# Patient Record
Sex: FEMALE | Race: OTHER | RURAL
[De-identification: names, ages, dates, MRNs, and addresses within clinical notes are randomized per-mention and may not be internally consistent; named-entity substitution may affect disease eponyms.]

---

## 2022-04-18 ENCOUNTER — CONSULTATION/EVALUATION (OUTPATIENT)
Dept: RURAL CLINIC 3 | Facility: CLINIC | Age: 82
End: 2022-04-18

## 2022-04-18 DIAGNOSIS — H25.13: ICD-10-CM

## 2022-04-18 PROCEDURE — 92004 COMPRE OPH EXAM NEW PT 1/>: CPT

## 2022-04-18 ASSESSMENT — VISUAL ACUITY
OD_BAT: 20/300
OS_CC: 20/60
OS_BAT: 20/400
OD_SC: 20/200
OD_PAM: 20/25+2
OS_PH: 20/50
OS_SC: 20/300
OS_AM: 20/60+1
OD_CC: 20/30-1

## 2022-04-18 ASSESSMENT — TONOMETRY
OD_IOP_MMHG: 11
OS_IOP_MMHG: 12

## 2022-04-18 NOTE — PATIENT DISCUSSION
(Surgical) Visually Significant (secondary to glare), discussed the risks, benefits, alternatives, and limitations of cataract surgery. The patient stated a full understanding and a desire to proceed with the procedure. The patient will need to return for pre-op appointment with cataract measurements and to have any additional questions answered, and start pre-operative eye drops as directed. Pt elects to proceed with OS first and then OD after with Stand/Trad OU & discussed need for bifocals S/p.

## 2022-05-18 ENCOUNTER — PRE-OP/H&P (OUTPATIENT)
Dept: RURAL CLINIC 3 | Facility: CLINIC | Age: 82
End: 2022-05-18

## 2022-05-18 VITALS
HEART RATE: 68 BPM | BODY MASS INDEX: 28.88 KG/M2 | WEIGHT: 163 LBS | HEIGHT: 63 IN | DIASTOLIC BLOOD PRESSURE: 74 MMHG | SYSTOLIC BLOOD PRESSURE: 122 MMHG

## 2022-05-18 DIAGNOSIS — Z01.818: ICD-10-CM

## 2022-05-18 PROCEDURE — 99499 UNLISTED E&M SERVICE: CPT

## 2022-05-18 ASSESSMENT — VISUAL ACUITY
OD_PAM: 20/25+2
OS_CC: 20/60
OS_BAT: 20/400
OD_BAT: 20/300
OD_SC: 20/200
OD_CC: 20/30-1
OS_SC: 20/300
OS_AM: 20/60+1
OS_PH: 20/50

## 2022-06-02 ENCOUNTER — POST OP/EVAL OF SECOND EYE (OUTPATIENT)
Dept: RURAL CLINIC 3 | Facility: CLINIC | Age: 82
End: 2022-06-02

## 2022-06-02 ENCOUNTER — SURGERY/PROCEDURE (OUTPATIENT)
Dept: RURAL CLINIC 3 | Facility: CLINIC | Age: 82
End: 2022-06-02

## 2022-06-02 VITALS
HEART RATE: 68 BPM | SYSTOLIC BLOOD PRESSURE: 124 MMHG | HEIGHT: 63 IN | DIASTOLIC BLOOD PRESSURE: 70 MMHG | WEIGHT: 163 LBS | BODY MASS INDEX: 28.88 KG/M2

## 2022-06-02 DIAGNOSIS — H25.12: ICD-10-CM

## 2022-06-02 PROCEDURE — 68841 INSJ RX ELUT IMPLT LAC CANAL: CPT

## 2022-06-02 PROCEDURE — 66984 XCAPSL CTRC RMVL W/O ECP: CPT

## 2022-06-02 PROCEDURE — 99024 POSTOP FOLLOW-UP VISIT: CPT

## 2022-06-02 ASSESSMENT — VISUAL ACUITY
OD_CC: 20/30-1
OD_BAT: 20/300
OD_SC: 20/200
OD_PAM: 20/25+2
OS_SC: 20/300

## 2022-06-02 ASSESSMENT — TONOMETRY
OD_IOP_MMHG: 11
OS_IOP_MMHG: 20

## 2022-06-02 NOTE — PATIENT DISCUSSION
Pt is doing well with lens in place & stable. Continue post op gtts & follow restrictions as instructed.

## 2022-06-07 ENCOUNTER — POST-OP (OUTPATIENT)
Dept: RURAL CLINIC 3 | Facility: CLINIC | Age: 82
End: 2022-06-07

## 2022-06-07 DIAGNOSIS — Z96.1: ICD-10-CM

## 2022-06-07 PROCEDURE — 99024 POSTOP FOLLOW-UP VISIT: CPT

## 2022-06-07 ASSESSMENT — VISUAL ACUITY
OD_CC: 20/40-2
OS_PH: 20/200
OD_PH: 20/30

## 2022-06-07 ASSESSMENT — TONOMETRY
OS_IOP_MMHG: 16
OD_IOP_MMHG: 16

## 2022-06-16 ENCOUNTER — SURGERY/PROCEDURE (OUTPATIENT)
Dept: RURAL CLINIC 4 | Facility: CLINIC | Age: 82
End: 2022-06-16

## 2022-06-16 ENCOUNTER — POST-OP (OUTPATIENT)
Dept: RURAL CLINIC 3 | Facility: CLINIC | Age: 82
End: 2022-06-16

## 2022-06-16 DIAGNOSIS — Z98.41: ICD-10-CM

## 2022-06-16 DIAGNOSIS — H25.11: ICD-10-CM

## 2022-06-16 DIAGNOSIS — Z98.42: ICD-10-CM

## 2022-06-16 PROCEDURE — 66984 XCAPSL CTRC RMVL W/O ECP: CPT

## 2022-06-16 PROCEDURE — 68841 INSJ RX ELUT IMPLT LAC CANAL: CPT

## 2022-06-16 PROCEDURE — 99024 POSTOP FOLLOW-UP VISIT: CPT

## 2022-06-16 ASSESSMENT — TONOMETRY
OS_IOP_MMHG: 14
OD_IOP_MMHG: 16

## 2022-06-16 ASSESSMENT — VISUAL ACUITY
OS_SC: 20/70
OD_SC: 20/300

## 2022-06-22 ENCOUNTER — POST-OP (OUTPATIENT)
Dept: RURAL CLINIC 3 | Facility: CLINIC | Age: 82
End: 2022-06-22

## 2022-06-22 DIAGNOSIS — Z98.41: ICD-10-CM

## 2022-06-22 DIAGNOSIS — Z98.42: ICD-10-CM

## 2022-06-22 PROCEDURE — 99024 POSTOP FOLLOW-UP VISIT: CPT

## 2022-06-22 ASSESSMENT — TONOMETRY
OS_IOP_MMHG: 14
OD_IOP_MMHG: 14

## 2022-06-22 ASSESSMENT — VISUAL ACUITY
OS_SC: 20/50-2
OD_SC: 20/40+2
OD_PH: 20/30-1

## 2022-06-22 NOTE — PATIENT DISCUSSION
Pt is doing well. Lens in place OU. Continue post op gtts and follow restrictions as directed. Pt to US with radiology tech.

## 2022-07-21 ENCOUNTER — POST-OP (OUTPATIENT)
Dept: RURAL CLINIC 3 | Facility: CLINIC | Age: 82
End: 2022-07-21

## 2022-07-21 DIAGNOSIS — Z98.42: ICD-10-CM

## 2022-07-21 DIAGNOSIS — Z98.41: ICD-10-CM

## 2022-07-21 PROCEDURE — 99024 POSTOP FOLLOW-UP VISIT: CPT

## 2022-07-21 ASSESSMENT — VISUAL ACUITY
OD_SC: 20/25-1
OS_SC: 20/40-2
OS_PH: 20/40

## 2022-07-21 ASSESSMENT — TONOMETRY
OD_IOP_MMHG: 14
OS_IOP_MMHG: 14

## 2022-07-21 NOTE — PATIENT DISCUSSION
Patient doing well and stable. Patient finished post op drops as directed. New glasses Rx given today. Continue to monitor.

## 2022-10-26 ENCOUNTER — FOLLOW UP (OUTPATIENT)
Dept: RURAL CLINIC 3 | Facility: CLINIC | Age: 82
End: 2022-10-26

## 2022-10-26 DIAGNOSIS — Z96.1: ICD-10-CM

## 2022-10-26 DIAGNOSIS — E11.9: ICD-10-CM

## 2022-10-26 PROCEDURE — 99213 OFFICE O/P EST LOW 20 MIN: CPT

## 2022-10-26 ASSESSMENT — TONOMETRY
OD_IOP_MMHG: 13
OS_IOP_MMHG: 13

## 2022-10-26 ASSESSMENT — VISUAL ACUITY
OD_SC: 20/25-1
OS_SC: 20/25-2

## 2024-08-03 NOTE — PATIENT DISCUSSION
(Surgical) Visually Significant (secondary to glare), discussed the risks, benefits, alternatives, and limitations of cataract surgery. The patient stated a full understanding and a desire to proceed with the procedure. The patient will need to return for pre-op appointment with cataract measurements and to have any additional questions answered, and start pre-operative eye drops as directed. A/S. Advance Care Planning   Healthcare Decision Maker:    Primary Decision Maker: Latanya [place] - Legal Guardian, Legal Guardian - [phone]    Click here to complete Healthcare Decision Makers including selection of the Healthcare Decision Maker Relationship (ie \"Primary\").  Today we documented Decision Maker(s) consistent with ACP documents on file.